# Patient Record
(demographics unavailable — no encounter records)

---

## 2025-03-03 NOTE — HISTORY OF PRESENT ILLNESS
[FreeTextEntry1] : Patient is a 75 yo F who presents for breast cancer surveillance. 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 S/p L TM & AND 7/30/14 (age 64) yielded 1.5cm IDC, negative margins, and 1/8LN. Denies fhx of breast or ovarian cancer. Patient is BRCA 1/2 negative, not full panel (tested 2014)- not on file. Patient denies nipple discharge, skin changes.  7/6/18: R MG- no evidence of malignancy 2/11/19: R MG- SIMON 8/26/20: R MG & US- SIMON. 10/28/21: R MG & US- BI RADS 2  10/31/22: R MG & US (palpable abnormality)- grouped calcifications UIQ consistent with fat necrosis, no abnormality at the area of patient's concern.  BI-RADS 3 right mammogram 6 months  4/6/23: R MG- heterogeneously dense. R coarse dystrophic calcs c/w fat necrosis UIQ. BI-RADS 2 12/1/23: R MG & US- heterogeneously dense. R postsurgical changes. SIMON. BI-RADS 2 3/6/2025R MG and US-scheduled

## 2025-03-03 NOTE — PHYSICAL EXAM
[Normocephalic] : normocephalic [EOMI] : extra ocular movement intact [Supple] : supple [No Supraclavicular Adenopathy] : no supraclavicular adenopathy [No Cervical Adenopathy] : no cervical adenopathy [de-identified] : R breast/axilla/supraclavicular area: No masses, discharge, or adenopathy\par   [de-identified] : L chest wall/axilla/supraclavicular area: No evidence of recurrence\par

## 2025-03-03 NOTE — PAST MEDICAL HISTORY
[Postmenopausal] : The patient is postmenopausal [Menarche Age ____] : age at menarche was [unfilled] [Menopause Age____] : age at menopause was [unfilled] [Total Preg ___] : G[unfilled] [Live Births ___] : P[unfilled]  [Abortions ___] : Abortions:[unfilled] [AB Spont ___] : miscarriages: [unfilled]  [History of Hormone Replacement Treatment] : has no history of hormone replacement treatment [FreeTextEntry6] : no [FreeTextEntry7] : no [FreeTextEntry8] : no

## 2025-04-16 NOTE — PHYSICAL EXAM
[de-identified] : R breast/axilla/supraclavicular area: No masses, discharge, or adenopathy\par   [de-identified] : L chest wall/axilla/supraclavicular area: No evidence of recurrence\par

## 2025-04-16 NOTE — HISTORY OF PRESENT ILLNESS
[FreeTextEntry1] : Patient is a 75 yo F who presents for breast cancer surveillance. Hx of neoadjuvant chemotherapy in 2014 for IDC (ER/IN-) w/ +axillary metastasis. S/p L TM & AND 7/30/14 (age 64) yielded 1.5cm IDC, negative margins, and 1/8LN. Denies fhx of breast or ovarian cancer. Patient is BRCA 1/2 negative, not full panel (tested 2014)- not on file. Patient denies nipple discharge, skin changes.  7/6/18: R MG- no evidence of malignancy 2/11/19: R MG- SIMON 8/26/20: R MG & US- SIMON. 10/28/21: R MG & US- BI RADS 2  10/31/22: R MG & US (palpable abnormality)- grouped calcifications UIQ consistent with fat necrosis, no abnormality at the area of patient's concern.  BI-RADS 3 right mammogram 6 months  4/6/23: R MG- heterogeneously dense. R coarse dystrophic calcs c/w fat necrosis UIQ. BI-RADS 2 12/1/23: R MG & US- heterogeneously dense. R postsurgical changes. SIMON. BI-RADS 2 4/16/2025 R MG and US-heterogeneously dense.  R 0.5 cm medial 2-3 CFN grouping of pleomorphic microcalcifications (rec stereotactic BX).  R multiple additional groupings of microcalcifications, stable.  BI-RADS 4

## 2025-04-16 NOTE — HISTORY OF PRESENT ILLNESS
[FreeTextEntry1] : Patient is a 73 yo F who presents for breast cancer surveillance. Hx of neoadjuvant chemotherapy in 2014 for IDC (ER/MA-) w/ +axillary metastasis. S/p L TM & AND 7/30/14 (age 64) yielded 1.5cm IDC, negative margins, and 1/8LN. Denies fhx of breast or ovarian cancer. Patient is BRCA 1/2 negative, not full panel (tested 2014)- not on file. Patient denies nipple discharge, skin changes.  7/6/18: R MG- no evidence of malignancy 2/11/19: R MG- SIMON 8/26/20: R MG & US- SIMON. 10/28/21: R MG & US- BI RADS 2  10/31/22: R MG & US (palpable abnormality)- grouped calcifications UIQ consistent with fat necrosis, no abnormality at the area of patient's concern.  BI-RADS 3 right mammogram 6 months  4/6/23: R MG- heterogeneously dense. R coarse dystrophic calcs c/w fat necrosis UIQ. BI-RADS 2 12/1/23: R MG & US- heterogeneously dense. R postsurgical changes. SIMON. BI-RADS 2 4/16/2025 R MG and US-heterogeneously dense.  R 0.5 cm medial 2-3 CFN grouping of pleomorphic microcalcifications (rec stereotactic BX).  R multiple additional groupings of microcalcifications, stable.  BI-RADS 4

## 2025-04-16 NOTE — PHYSICAL EXAM
[de-identified] : R breast/axilla/supraclavicular area: No masses, discharge, or adenopathy\par   [de-identified] : L chest wall/axilla/supraclavicular area: No evidence of recurrence\par

## 2025-04-16 NOTE — HISTORY OF PRESENT ILLNESS
[FreeTextEntry1] : Patient is a 75 yo F who presents for breast cancer surveillance. Hx of neoadjuvant chemotherapy in 2014 for IDC (ER/NH-) w/ +axillary metastasis. S/p L TM & AND 7/30/14 (age 64) yielded 1.5cm IDC, negative margins, and 1/8LN. Denies fhx of breast or ovarian cancer. Patient is BRCA 1/2 negative, not full panel (tested 2014)- not on file. Patient denies nipple discharge, skin changes.  7/6/18: R MG- no evidence of malignancy 2/11/19: R MG- SIMON 8/26/20: R MG & US- SIMON. 10/28/21: R MG & US- BI RADS 2  10/31/22: R MG & US (palpable abnormality)- grouped calcifications UIQ consistent with fat necrosis, no abnormality at the area of patient's concern.  BI-RADS 3 right mammogram 6 months  4/6/23: R MG- heterogeneously dense. R coarse dystrophic calcs c/w fat necrosis UIQ. BI-RADS 2 12/1/23: R MG & US- heterogeneously dense. R postsurgical changes. SIMON. BI-RADS 2 4/16/2025 R MG and US-heterogeneously dense.  R 0.5 cm medial 2-3 CFN grouping of pleomorphic microcalcifications (rec stereotactic BX).  R multiple additional groupings of microcalcifications, stable.  BI-RADS 4

## 2025-04-16 NOTE — PHYSICAL EXAM
[de-identified] : R breast/axilla/supraclavicular area: No masses, discharge, or adenopathy\par   [de-identified] : L chest wall/axilla/supraclavicular area: No evidence of recurrence\par

## 2025-04-16 NOTE — HISTORY OF PRESENT ILLNESS
[FreeTextEntry1] : Patient is a 75 yo F who presents for breast cancer surveillance. Hx of neoadjuvant chemotherapy in 2014 for IDC (ER/NM-) w/ +axillary metastasis. S/p L TM & AND 7/30/14 (age 64) yielded 1.5cm IDC, negative margins, and 1/8LN. Denies fhx of breast or ovarian cancer. Patient is BRCA 1/2 negative, not full panel (tested 2014)- not on file. Patient denies nipple discharge, skin changes.  7/6/18: R MG- no evidence of malignancy 2/11/19: R MG- SIMON 8/26/20: R MG & US- SIMON. 10/28/21: R MG & US- BI RADS 2  10/31/22: R MG & US (palpable abnormality)- grouped calcifications UIQ consistent with fat necrosis, no abnormality at the area of patient's concern.  BI-RADS 3 right mammogram 6 months  4/6/23: R MG- heterogeneously dense. R coarse dystrophic calcs c/w fat necrosis UIQ. BI-RADS 2 12/1/23: R MG & US- heterogeneously dense. R postsurgical changes. SIMON. BI-RADS 2 4/16/2025 R MG and US-heterogeneously dense.  R 0.5 cm medial 2-3 CFN grouping of pleomorphic microcalcifications (rec stereotactic BX).  R multiple additional groupings of microcalcifications, stable.  BI-RADS 4

## 2025-04-16 NOTE — PHYSICAL EXAM
[de-identified] : R breast/axilla/supraclavicular area: No masses, discharge, or adenopathy\par   [de-identified] : L chest wall/axilla/supraclavicular area: No evidence of recurrence\par

## 2025-04-16 NOTE — PAST MEDICAL HISTORY
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